# Patient Record
Sex: FEMALE | Race: WHITE | NOT HISPANIC OR LATINO | Employment: FULL TIME | URBAN - METROPOLITAN AREA
[De-identification: names, ages, dates, MRNs, and addresses within clinical notes are randomized per-mention and may not be internally consistent; named-entity substitution may affect disease eponyms.]

---

## 2022-05-15 PROCEDURE — U0004 COV-19 TEST NON-CDC HGH THRU: HCPCS | Performed by: FAMILY MEDICINE

## 2023-01-01 ENCOUNTER — TELEMEDICINE (OUTPATIENT)
Dept: FAMILY MEDICINE CLINIC | Facility: TELEHEALTH | Age: 43
End: 2023-01-01
Payer: MEDICAID

## 2023-01-01 DIAGNOSIS — J20.9 ACUTE BRONCHITIS, UNSPECIFIED ORGANISM: Primary | ICD-10-CM

## 2023-01-01 PROBLEM — G47.00 INSOMNIA: Status: ACTIVE | Noted: 2020-02-13

## 2023-01-01 PROBLEM — R74.8 ELEVATED LIVER ENZYMES: Status: ACTIVE | Noted: 2020-05-08

## 2023-01-01 PROBLEM — B18.2 CHRONIC HEPATITIS C: Status: ACTIVE | Noted: 2019-08-12

## 2023-01-01 PROBLEM — F11.21 OPIOID DEPENDENCE IN REMISSION: Status: ACTIVE | Noted: 2019-08-12

## 2023-01-01 PROBLEM — R13.19 INTERMITTENT DYSPHAGIA: Status: ACTIVE | Noted: 2017-06-23

## 2023-01-01 PROBLEM — I10 HYPERTENSIVE DISORDER: Status: ACTIVE | Noted: 2017-08-17

## 2023-01-01 PROBLEM — F32.A DEPRESSIVE DISORDER: Status: ACTIVE | Noted: 2017-08-17

## 2023-01-01 PROBLEM — N94.3 PREMENSTRUAL TENSION SYNDROME: Status: ACTIVE | Noted: 2017-08-17

## 2023-01-01 PROBLEM — Z00.00 WELL ADULT HEALTH CHECK: Status: ACTIVE | Noted: 2020-08-18

## 2023-01-01 PROBLEM — F17.200 NICOTINE DEPENDENCE: Status: ACTIVE | Noted: 2020-02-13

## 2023-01-01 PROCEDURE — 99213 OFFICE O/P EST LOW 20 MIN: CPT | Performed by: NURSE PRACTITIONER

## 2023-01-01 RX ORDER — DEXTROAMPHETAMINE SACCHARATE, AMPHETAMINE ASPARTATE, DEXTROAMPHETAMINE SULFATE AND AMPHETAMINE SULFATE 5; 5; 5; 5 MG/1; MG/1; MG/1; MG/1
1 TABLET ORAL DAILY
COMMUNITY
Start: 2022-12-06

## 2023-01-01 RX ORDER — ZOLPIDEM TARTRATE 12.5 MG/1
12.5 TABLET, FILM COATED, EXTENDED RELEASE ORAL
COMMUNITY
Start: 2022-12-06

## 2023-01-01 RX ORDER — LINACLOTIDE 145 UG/1
CAPSULE, GELATIN COATED ORAL
COMMUNITY
Start: 2022-10-06 | End: 2023-01-01 | Stop reason: DRUGHIGH

## 2023-01-01 RX ORDER — BUPRENORPHINE AND NALOXONE 12; 3 MG/1; MG/1
FILM, SOLUBLE BUCCAL; SUBLINGUAL
COMMUNITY
Start: 2022-12-21

## 2023-01-01 RX ORDER — DEXTROMETHORPHAN HYDROBROMIDE AND PROMETHAZINE HYDROCHLORIDE 15; 6.25 MG/5ML; MG/5ML
5 SYRUP ORAL 4 TIMES DAILY PRN
Qty: 120 ML | Refills: 0 | Status: SHIPPED | OUTPATIENT
Start: 2023-01-01 | End: 2023-03-19

## 2023-01-01 RX ORDER — DEXTROAMPHETAMINE SULFATE, DEXTROAMPHETAMINE SACCHARATE, AMPHETAMINE SULFATE AND AMPHETAMINE ASPARTATE 7.5; 7.5; 7.5; 7.5 MG/1; MG/1; MG/1; MG/1
30 CAPSULE, EXTENDED RELEASE ORAL EVERY MORNING
COMMUNITY
Start: 2022-12-06

## 2023-01-01 RX ORDER — METHYLPREDNISOLONE 4 MG/1
TABLET ORAL
Qty: 21 TABLET | Refills: 0 | Status: SHIPPED | OUTPATIENT
Start: 2023-01-01 | End: 2023-02-09

## 2023-01-01 RX ORDER — COVID-19 MOLECULAR TEST ASSAY
1 KIT MISCELLANEOUS ONCE
Qty: 1 KIT | Refills: 0 | Status: SHIPPED | OUTPATIENT
Start: 2023-01-01 | End: 2023-01-01

## 2023-01-01 RX ORDER — BUPRENORPHINE AND NALOXONE 8; 2 MG/1; MG/1
FILM, SOLUBLE BUCCAL; SUBLINGUAL
COMMUNITY
Start: 2022-12-13

## 2023-01-01 NOTE — PATIENT INSTRUCTIONS
Drink plenty of water  Over the counter pain relievers okay   If symptoms do not improve in 3-5 days follow up with your primary care provider or urgent care  \  Acute Bronchitis, Adult  Acute bronchitis is when air tubes in the lungs (bronchi) suddenly get swollen. The condition can make it hard for you to breathe. In adults, acute bronchitis usually goes away within 2 weeks. A cough caused by bronchitis may last up to 3 weeks. Smoking, allergies, and asthma can make the condition worse.  What are the causes?  Germs that cause cold and flu (viruses). The most common cause of this condition is the virus that causes the common cold.  Bacteria.  Substances that bother (irritate) the lungs, including:  Smoke from cigarettes and other types of tobacco.  Dust and pollen.  Fumes from chemicals, gases, or burned fuel.  Indoor or outdoor air pollution.  What increases the risk?  A weak body's defense system. This is also called the immune system.  Any condition that affects your lungs and breathing, such as asthma.  What are the signs or symptoms?  A cough.  Coughing up clear, yellow, or green mucus.  Making high-pitched whistling sounds when you breathe, most often when you breathe out (wheezing).  Runny or stuffy nose.  Having too much mucus in your lungs (chest congestion).  Shortness of breath.  Body aches.  A sore throat.  How is this treated?  Acute bronchitis may go away over time without treatment. Your doctor may tell you to:  Drink more fluids. This will help thin your mucus so it is easier to cough up.  Use a device that gets medicine into your lungs (inhaler).  Use a vaporizer or a humidifier. These are machines that add water to the air. This helps with coughing and poor breathing.  Take a medicine that thins mucus and helps clear it from your lungs.  Take a medicine that prevents or stops coughing.  It is not common to take an antibiotic medicine for this condition.  Follow these instructions at home:    Take  over-the-counter and prescription medicines only as told by your doctor.  Use an inhaler, vaporizer, or humidifier as told by your doctor.  Take two teaspoons (10 mL) of honey at bedtime. This helps lessen your coughing at night.  Drink enough fluid to keep your pee (urine) pale yellow.  Do not smoke or use any products that contain nicotine or tobacco. If you need help quitting, ask your doctor.  Get a lot of rest.  Return to your normal activities when your doctor says that it is safe.  Keep all follow-up visits.  How is this prevented?    Wash your hands often with soap and water for at least 20 seconds. If you cannot use soap and water, use hand .  Avoid contact with people who have cold symptoms.  Try not to touch your mouth, nose, or eyes with your hands.  Avoid breathing in smoke or chemical fumes.  Make sure to get the flu shot every year.  Contact a doctor if:  Your symptoms do not get better in 2 weeks.  You have trouble coughing up the mucus.  Your cough keeps you awake at night.  You have a fever.  Get help right away if:  You cough up blood.  You have chest pain.  You have very bad shortness of breath.  You faint or keep feeling like you are going to faint.  You have a very bad headache.  Your fever or chills get worse.  These symptoms may be an emergency. Get help right away. Call your local emergency services (911 in the [place]  Do not wait to see if the symptoms will go away.  Do not drive yourself to the hospital.  Summary  Acute bronchitis is when air tubes in the lungs (bronchi) suddenly get swollen. In adults, acute bronchitis usually goes away within 2 weeks.  Drink more fluids. This will help thin your mucus so it is easier to cough up.  Take over-the-counter and prescription medicines only as told by your doctor.  Contact a doctor if your symptoms do not improve after 2 weeks of treatment.  This information is not intended to replace advice given to you by your health care provider.  Make sure you discuss any questions you have with your health care provider.  Document Revised: 04/20/2022 Document Reviewed: 04/20/2022  Elsevier Patient Education © 2022 Elsevier Inc.

## 2023-01-01 NOTE — PROGRESS NOTES
CHIEF COMPLAINT  Chief Complaint   Patient presents with   • Cough         HPI  Andra Crandall is a 42 y.o. female  presents with complaint of cough and chest congestion.     Review of Systems   Constitutional: Positive for fatigue. Negative for chills, diaphoresis and fever.   HENT: Positive for congestion (mild ), ear pain and postnasal drip (small amount ). Negative for rhinorrhea, sinus pressure, sinus pain, sneezing and sore throat.    Respiratory: Positive for cough. Negative for chest tightness, shortness of breath and wheezing.    Gastrointestinal: Negative for diarrhea, nausea and vomiting.   Neurological: Positive for headaches.       Past Medical History:   Diagnosis Date   • Depression    • Hypertension    • Substance abuse (HCC)     7 yrs ago       No family history on file.    Social History     Socioeconomic History   • Marital status: Unknown   Tobacco Use   • Smoking status: Some Days     Types: Cigarettes   • Smokeless tobacco: Never   Vaping Use   • Vaping Use: Never used   Substance and Sexual Activity   • Alcohol use: No   • Drug use: No   • Sexual activity: Defer       Andra Crandall  reports that she has been smoking cigarettes. She has never used smokeless tobacco.. I have educated her on the risk of diseases from using tobacco products such as cancer, COPD and heart disease.     I advised her to quit and she is not willing to quit.    I spent 1 minutes counseling the patient.       There were no vitals taken for this visit.    PHYSICAL EXAM  Physical Exam   Constitutional: She is oriented to person, place, and time. She appears well-developed and well-nourished. She does not have a sickly appearance. She does not appear ill. No distress.   HENT:   Head: Normocephalic and atraumatic.   Eyes: EOM are normal.   Neck: Neck normal appearance.  Pulmonary/Chest: Effort normal.  No respiratory distress.  Neurological: She is alert and oriented to person, place, and time.   Skin: Skin is dry.    Psychiatric: She has a normal mood and affect.         Diagnoses and all orders for this visit:    1. Acute bronchitis, unspecified organism (Primary)    Other orders  -     methylPREDNISolone (MEDROL) 4 MG dose pack; Take early in the day and take with food. Take as directed on package instructions.  Dispense: 21 tablet; Refill: 0  -     promethazine-dextromethorphan (PROMETHAZINE-DM) 6.25-15 MG/5ML syrup; Take 5 mL by mouth 4 (Four) Times a Day As Needed for Cough.  Dispense: 120 mL; Refill: 0  -     COVID-19 At Home Antigen Test (BinaxNOW COVID-19 Ag Home Test) kit; 1 application by In Vitro route 1 (One) Time for 1 dose.  Dispense: 1 kit; Refill: 0    Recommend COVID -19 testing     The use of a video visit has been reviewed with the patient and verbal informed consent has been obtained. Myself and Andra Crandall participated in this visit. The patient is located in [address] [place] [address]. I am located in [place], [place]. [employer] and [employer] were utilized.       Note Disclaimer: At [facility], we believe that sharing information builds trust and better   relationships. You are receiving this note because you recently visited [facility]. It is possible you   will see health information before a provider has talked with you about it. This kind of information can   be easy to misunderstand. To help you fully understand what it means for your health, we urge you to   discuss this note with your provider.    KERRY Verma  01/01/2023  10:58 EST

## 2023-02-09 ENCOUNTER — TELEMEDICINE (OUTPATIENT)
Dept: FAMILY MEDICINE CLINIC | Facility: TELEHEALTH | Age: 43
End: 2023-02-09
Payer: MEDICAID

## 2023-02-09 DIAGNOSIS — J06.9 UPPER RESPIRATORY TRACT INFECTION, UNSPECIFIED TYPE: Primary | ICD-10-CM

## 2023-02-09 PROCEDURE — 99213 OFFICE O/P EST LOW 20 MIN: CPT | Performed by: NURSE PRACTITIONER

## 2023-02-09 RX ORDER — COVID-19 MOLECULAR TEST ASSAY
KIT MISCELLANEOUS SEE ADMIN INSTRUCTIONS
COMMUNITY
Start: 2023-01-02 | End: 2023-03-19

## 2023-02-09 RX ORDER — METHYLPREDNISOLONE 4 MG/1
TABLET ORAL
Qty: 21 TABLET | Refills: 0 | Status: SHIPPED | OUTPATIENT
Start: 2023-02-09 | End: 2023-03-19

## 2023-02-09 RX ORDER — BROMPHENIRAMINE MALEATE, PSEUDOEPHEDRINE HYDROCHLORIDE, AND DEXTROMETHORPHAN HYDROBROMIDE 2; 30; 10 MG/5ML; MG/5ML; MG/5ML
10 SYRUP ORAL 4 TIMES DAILY PRN
Qty: 200 ML | Refills: 0 | Status: SHIPPED | OUTPATIENT
Start: 2023-02-09 | End: 2023-03-19

## 2023-02-09 RX ORDER — MIRTAZAPINE 15 MG/1
TABLET, FILM COATED ORAL
COMMUNITY
Start: 2023-01-10

## 2023-02-09 NOTE — PROGRESS NOTES
You have chosen to receive care through a telehealth visit.  Do you consent to use a video/audio connection for your medical care today? Yes     CHIEF COMPLAINT  No chief complaint on file.        HPI  Andra Crandall is a 43 y.o. female  presents with complaint of 2 day history of nasal congestion, PND, mild chest congestion, dry cough, swollen nodes under chin and in front of neck. Denies fever. COVID test was negative.     Review of Systems   See HPI    Past Medical History:   Diagnosis Date   • Depression    • Hypertension    • Substance abuse (HCC)     7 yrs ago       No family history on file.    Social History     Socioeconomic History   • Marital status:    Tobacco Use   • Smoking status: Some Days     Types: Cigarettes   • Smokeless tobacco: Never   Vaping Use   • Vaping Use: Never used   Substance and Sexual Activity   • Alcohol use: No   • Drug use: No   • Sexual activity: Defer       Andra Crandall  reports that she has been smoking cigarettes. She has never used smokeless tobacco..            There were no vitals taken for this visit.    PHYSICAL EXAM  Physical Exam   Constitutional: She is oriented to person, place, and time. She appears well-developed and well-nourished. She does not have a sickly appearance. She does not appear ill.   HENT:   Head: Normocephalic and atraumatic.   Pulmonary/Chest: Effort normal.  No respiratory distress (persistent cough during visit).  Neurological: She is alert and oriented to person, place, and time.         Diagnoses and all orders for this visit:    1. Upper respiratory tract infection, unspecified type (Primary)  -     methylPREDNISolone (MEDROL) 4 MG dose pack; Take as directed on package instructions.  Dispense: 21 tablet; Refill: 0  -     brompheniramine-pseudoephedrine-DM 30-2-10 MG/5ML syrup; Take 10 mL by mouth 4 (Four) Times a Day As Needed for Congestion, Cough or Allergies.  Dispense: 200 mL; Refill: 0    --take medications as  prescribed  --increase fluids, rest as needed, tylenol or ibuprofen for pain  --f/u in 5-7 days if no improvement        FOLLOW-UP  As discussed during visit with PCP/[facility] if no improvement or Urgent Care/Emergency Department if worsening of symptoms    Patient verbalizes understanding of medication dosage, comfort measures, instructions for treatment and follow-up.    Lyssa Bishop, APRN  02/09/2023  16:10 EST    The use of a video visit has been reviewed with the patient and verbal informed consent has been obtained. Myself and Andra Crandall participated in this visit. The patient is located in [address] [place] [address].    I am located in [place], [place]. [employer] and [employer] were utilized. I spent 8 minutes in the patient's chart for this visit.

## 2023-02-09 NOTE — PATIENT INSTRUCTIONS
Upper Respiratory Infection, Adult  An upper respiratory infection (URI) is a common viral infection of the nose, throat, and upper air passages that lead to the lungs. The most common type of URI is the common cold. URIs usually get better on their own, without medical treatment.  What are the causes?  A URI is caused by a virus. You may catch a virus by:  Breathing in droplets from an infected person's cough or sneeze.  Touching something that has been exposed to the virus (is contaminated) and then touching your mouth, nose, or eyes.  What increases the risk?  You are more likely to get a URI if:  You are very young or very old.  You have close contact with others, such as at work, school, or a health care facility.  You smoke.  You have long-term (chronic) heart or lung disease.  You have a weakened disease-fighting system (immune system).  You have nasal allergies or asthma.  You are experiencing a lot of stress.  You have poor nutrition.  What are the signs or symptoms?  A URI usually involves some of the following symptoms:  Runny or stuffy (congested) nose.  Cough.  Sneezing.  Sore throat.  Headache.  Fatigue.  Fever.  Loss of appetite.  Pain in your forehead, behind your eyes, and over your cheekbones (sinus pain).  Muscle aches.  Redness or irritation of the eyes.  Pressure in the ears or face.  How is this diagnosed?  This condition may be diagnosed based on your medical history and symptoms, and a physical exam. Your health care provider may use a swab to take a mucus sample from your nose (nasal swab). This sample can be tested to determine what virus is causing the illness.  How is this treated?  URIs usually get better on their own within 7-10 days. Medicines cannot cure URIs, but your health care provider may recommend certain medicines to help relieve symptoms, such as:  Over-the-counter cold medicines.  Cough suppressants. Coughing is a type of defense against infection that helps to clear the  respiratory system, so take these medicines only as recommended by your health care provider.  Fever-reducing medicines.  Follow these instructions at home:  Activity  Rest as needed.  If you have a fever, stay home from work or school until your fever is gone or until your health care provider says your URI cannot spread to other people (is no longer contagious). Your health care provider may have you wear a face mask to prevent your infection from spreading.  Relieving symptoms  Gargle with a mixture of salt and water 3-4 times a day or as needed. To make salt water, completely dissolve ½-1 tsp (3-6 g) of salt in 1 cup (237 mL) of warm water.  Use a cool-mist humidifier to add moisture to the air. This can help you breathe more easily.  Eating and drinking    Drink enough fluid to keep your urine pale yellow.  Eat soups and other clear broths.  General instructions    Take over-the-counter and prescription medicines only as told by your health care provider. These include cold medicines, fever reducers, and cough suppressants.  Do not use any products that contain nicotine or tobacco. These products include cigarettes, chewing tobacco, and vaping devices, such as e-cigarettes. If you need help quitting, ask your health care provider.  Stay away from secondhand smoke.  Stay up to date on all immunizations, including the yearly (annual) flu vaccine.  Keep all follow-up visits. This is important.  How to prevent the spread of infection to others  URIs can be contagious. To prevent the infection from spreading:  Wash your hands with soap and water for at least 20 seconds. If soap and water are not available, use hand .  Avoid touching your mouth, face, eyes, or nose.  Cough or sneeze into a tissue or your sleeve or elbow instead of into your hand or into the air.    Contact a health care provider if:  You are getting worse instead of better.  You have a fever or chills.  Your mucus is brown or red.  You have  yellow or brown discharge coming from your nose.  You have pain in your face, especially when you bend forward.  You have swollen neck glands.  You have pain while swallowing.  You have white areas in the back of your throat.  Get help right away if:  You have shortness of breath that gets worse.  You have severe or persistent:  Headache.  Ear pain.  Sinus pain.  Chest pain.  You have chronic lung disease along with any of the following:  Making high-pitched whistling sounds when you breathe, most often when you breathe out (wheezing).  Prolonged cough (more than 14 days).  Coughing up blood.  A change in your usual mucus.  You have a stiff neck.  You have changes in your:  Vision.  Hearing.  Thinking.  Mood.  These symptoms may be an emergency. Get help right away. Call 911.  Do not wait to see if the symptoms will go away.  Do not drive yourself to the hospital.  Summary  An upper respiratory infection (URI) is a common infection of the nose, throat, and upper air passages that lead to the lungs.  A URI is caused by a virus.  URIs usually get better on their own within 7-10 days.  Medicines cannot cure URIs, but your health care provider may recommend certain medicines to help relieve symptoms.  This information is not intended to replace advice given to you by your health care provider. Make sure you discuss any questions you have with your health care provider.  Document Revised: 07/20/2022 Document Reviewed: 07/20/2022  [employer] Patient Education © 2022 Elsevier Inc.

## 2023-03-19 ENCOUNTER — TELEMEDICINE (OUTPATIENT)
Dept: FAMILY MEDICINE CLINIC | Facility: TELEHEALTH | Age: 43
End: 2023-03-19
Payer: MEDICAID

## 2023-03-19 DIAGNOSIS — J01.00 ACUTE NON-RECURRENT MAXILLARY SINUSITIS: Primary | ICD-10-CM

## 2023-03-19 PROCEDURE — 99213 OFFICE O/P EST LOW 20 MIN: CPT | Performed by: NURSE PRACTITIONER

## 2023-03-19 RX ORDER — PREDNISONE 20 MG/1
20 TABLET ORAL 2 TIMES DAILY
Qty: 10 TABLET | Refills: 0 | Status: SHIPPED | OUTPATIENT
Start: 2023-03-19 | End: 2023-03-24

## 2023-03-19 RX ORDER — AMOXICILLIN AND CLAVULANATE POTASSIUM 875; 125 MG/1; MG/1
1 TABLET, FILM COATED ORAL 2 TIMES DAILY
Qty: 20 TABLET | Refills: 0 | Status: SHIPPED | OUTPATIENT
Start: 2023-03-19 | End: 2023-03-29

## 2023-03-19 RX ORDER — FLUTICASONE PROPIONATE 50 MCG
2 SPRAY, SUSPENSION (ML) NASAL DAILY
Qty: 18.2 ML | Refills: 0 | Status: SHIPPED | OUTPATIENT
Start: 2023-03-19

## 2023-03-19 NOTE — PROGRESS NOTES
You have chosen to receive care through a telehealth visit.  Do you consent to use a video/audio connection for your medical care today? Yes     CHIEF COMPLAINT  Chief Complaint   Patient presents with   • Cough         HPI  Andra Crandall is a 43 y.o. female  presents with complaint of    Review of Systems   HENT: Positive for postnasal drip, sinus pressure and sinus pain.    Respiratory: Positive for cough.    All other systems reviewed and are negative.      Past Medical History:   Diagnosis Date   • Depression    • Hypertension    • Substance abuse (HCC)     7 yrs ago       History reviewed. No pertinent family history.    Social History     Socioeconomic History   • Marital status:    Tobacco Use   • Smoking status: Some Days     Types: Cigarettes   • Smokeless tobacco: Never   Vaping Use   • Vaping Use: Never used   Substance and Sexual Activity   • Alcohol use: No   • Drug use: No   • Sexual activity: Defer       Andra Crandall  reports that she has been smoking cigarettes. She has never used smokeless tobacco.. I have educated her on the risk of diseases from using tobacco products such as cancer, COPD and heart disease.     I advised her to quit and she is not willing to quit.    I spent 3  minutes counseling the patient.              There were no vitals taken for this visit.    PHYSICAL EXAM  Physical Exam   Constitutional: She appears well-developed and well-nourished.   HENT:   Head: Normocephalic.   Eyes: Pupils are equal, round, and reactive to light.   Pulmonary/Chest: Effort normal.   Musculoskeletal: Normal range of motion.   Neurological: She is alert.   Psychiatric: She has a normal mood and affect.       Results for orders placed or performed during the hospital encounter of 05/15/22   COVID-19 PCR, [email or web address], NP SWAB IN LEXAR VIRAL TRANSPORT MEDIA/ORAL SWISH 24-30 HR TAT - Saliva, Oral Cavity    Specimen: Oral Cavity; Saliva   Result Value Ref Range    SARS-CoV-2 RAFA Not  Detected Not Detected       Diagnoses and all orders for this visit:    1. Acute non-recurrent maxillary sinusitis (Primary)  -     amoxicillin-clavulanate (Augmentin) 875-125 MG per tablet; Take 1 tablet by mouth 2 (Two) Times a Day for 10 days.  Dispense: 20 tablet; Refill: 0  -     fluticasone (FLONASE) 50 MCG/ACT nasal spray; 2 sprays into the nostril(s) as directed by provider Daily.  Dispense: 18.2 mL; Refill: 0  -     predniSONE (DELTASONE) 20 MG tablet; Take 1 tablet by mouth 2 (Two) Times a Day for 5 days.  Dispense: 10 tablet; Refill: 0          FOLLOW-UP  As discussed during visit with PCP/[facility] if no improvement or Urgent Care/Emergency Department if worsening of symptoms    Patient verbalizes understanding of medication dosage, comfort measures, instructions for treatment and follow-up.    Nancie Cloud, KERRY  03/19/2023  19:09 EDT    The use of a video visit has been reviewed with the patient and verbal informed consent has been obtained. Myself and Andra Crandall participated in this visit. The patient is located in [address] [place] [address]  [place] [id number].    I am located in [place], [place]. [employer] and [employer] were utilized. I spent 20 minutes in the patient's chart for this visit.

## 2023-08-04 ENCOUNTER — TELEMEDICINE (OUTPATIENT)
Dept: FAMILY MEDICINE CLINIC | Facility: TELEHEALTH | Age: 43
End: 2023-08-04
Payer: MEDICAID

## 2023-08-04 DIAGNOSIS — R39.89 SUSPECTED UTI: Primary | ICD-10-CM

## 2023-08-04 RX ORDER — NITROFURANTOIN 25; 75 MG/1; MG/1
100 CAPSULE ORAL 2 TIMES DAILY
Qty: 10 CAPSULE | Refills: 0 | Status: SHIPPED | OUTPATIENT
Start: 2023-08-04 | End: 2023-08-09

## 2023-08-04 RX ORDER — PHENAZOPYRIDINE HYDROCHLORIDE 200 MG/1
200 TABLET, FILM COATED ORAL 3 TIMES DAILY PRN
Qty: 6 TABLET | Refills: 0 | Status: SHIPPED | OUTPATIENT
Start: 2023-08-04 | End: 2023-08-06

## 2023-08-04 RX ORDER — FLUCONAZOLE 150 MG/1
TABLET ORAL
Qty: 2 TABLET | Refills: 0 | Status: SHIPPED | OUTPATIENT
Start: 2023-08-04

## 2023-10-26 ENCOUNTER — TELEMEDICINE (OUTPATIENT)
Dept: FAMILY MEDICINE CLINIC | Facility: TELEHEALTH | Age: 43
End: 2023-10-26
Payer: MEDICAID

## 2023-10-26 DIAGNOSIS — R39.89 SUSPECTED UTI: Primary | ICD-10-CM

## 2023-10-26 RX ORDER — PHENAZOPYRIDINE HYDROCHLORIDE 200 MG/1
200 TABLET, FILM COATED ORAL 3 TIMES DAILY PRN
Qty: 6 TABLET | Refills: 0 | Status: SHIPPED | OUTPATIENT
Start: 2023-10-26 | End: 2023-10-28

## 2023-10-26 RX ORDER — NITROFURANTOIN 25; 75 MG/1; MG/1
100 CAPSULE ORAL 2 TIMES DAILY
Qty: 10 CAPSULE | Refills: 0 | Status: SHIPPED | OUTPATIENT
Start: 2023-10-26 | End: 2023-10-31

## 2023-10-26 RX ORDER — MICONAZOLE NITRATE 40 MG/G
1 CREAM VAGINAL
Qty: 3 G | Refills: 0 | Status: SHIPPED | OUTPATIENT
Start: 2023-10-26 | End: 2023-10-29

## 2023-10-26 NOTE — PROGRESS NOTES
GHAZALA  Andra Crandall is a 43 y.o. female  presents with complaint of two day history of malodorous urine, urgency, dysuria. Has history of UTI's and this feels the same. Denies fever, chills, sweats, flank pain.     Review of Systems    Past Medical History:   Diagnosis Date    Depression     Hypertension     Substance abuse     7 yrs ago       No family history on file.    Social History     Socioeconomic History    Marital status:    Tobacco Use    Smoking status: Some Days     Types: Cigarettes    Smokeless tobacco: Never   Vaping Use    Vaping Use: Never used   Substance and Sexual Activity    Alcohol use: No    Drug use: No    Sexual activity: Defer         There were no vitals taken for this visit.    PHYSICAL EXAM  Physical Exam   Constitutional: She appears well-developed and well-nourished.   HENT:   Head: Normocephalic.   Nose: Nose normal.   Neck: Neck normal appearance.  Pulmonary/Chest: Effort normal.   Neurological: She is alert.   Psychiatric: She has a normal mood and affect. Her speech is normal.       Diagnoses and all orders for this visit:    1. Suspected UTI (Primary)  -     nitrofurantoin, macrocrystal-monohydrate, (Macrobid) 100 MG capsule; Take 1 capsule by mouth 2 (Two) Times a Day for 5 days.  Dispense: 10 capsule; Refill: 0  -     phenazopyridine (Pyridium) 200 MG tablet; Take 1 tablet by mouth 3 (Three) Times a Day As Needed for Bladder Spasms for up to 2 days.  Dispense: 6 tablet; Refill: 0  -     Miconazole Nitrate (Miconazole 3) 4 % cream; Insert 1 application  into the vagina every night at bedtime for 3 days.  Dispense: 3 g; Refill: 0          FOLLOW-UP  As discussed during visit with [facility], if symptoms worsen or fail to improve, follow-up with PCP/Urgent Care/Emergency Department.    Patient verbalizes understanding of medications, instructions for treatment and follow-up.    Sosa Oshea, APRN  10/26/2023  11:13 EDT    The use of a video visit has  been reviewed with the patient and verbal informed consent has been obtained. Myself and Andra Crandall participated in this visit. The patient is located in [place], [place], and I am located in [place], [place]. [employer] and [employer] were utilized.

## 2023-10-30 RX ORDER — SULFAMETHOXAZOLE AND TRIMETHOPRIM 800; 160 MG/1; MG/1
1 TABLET ORAL 2 TIMES DAILY
Qty: 20 TABLET | Refills: 0 | Status: SHIPPED | OUTPATIENT
Start: 2023-10-30 | End: 2023-11-09

## 2024-01-21 ENCOUNTER — PATIENT MESSAGE (OUTPATIENT)
Dept: FAMILY MEDICINE CLINIC | Facility: TELEHEALTH | Age: 44
End: 2024-01-21

## 2024-01-21 ENCOUNTER — TELEMEDICINE (OUTPATIENT)
Dept: FAMILY MEDICINE CLINIC | Facility: TELEHEALTH | Age: 44
End: 2024-01-21
Payer: MEDICAID

## 2024-01-21 DIAGNOSIS — J01.00 ACUTE NON-RECURRENT MAXILLARY SINUSITIS: Primary | ICD-10-CM

## 2024-01-21 RX ORDER — PREDNISONE 20 MG/1
20 TABLET ORAL 2 TIMES DAILY
Qty: 10 TABLET | Refills: 0 | Status: SHIPPED | OUTPATIENT
Start: 2024-01-21 | End: 2024-01-26

## 2024-01-21 RX ORDER — AMOXICILLIN AND CLAVULANATE POTASSIUM 875; 125 MG/1; MG/1
1 TABLET, FILM COATED ORAL 2 TIMES DAILY
Qty: 20 TABLET | Refills: 0 | Status: SHIPPED | OUTPATIENT
Start: 2024-01-21 | End: 2024-01-31

## 2024-01-22 NOTE — PROGRESS NOTES
You have chosen to receive care through a telehealth visit.  Do you consent to use a video/audio connection for your medical care today? Yes     CHIEF COMPLAINT  Chief Complaint   Patient presents with    Sinusitis         HPI  Andra Crandall is a 44 y.o. female  presents with complaint of sinus pain and pressure with headache and cough x 5 days.    Review of Systems   HENT:  Positive for postnasal drip, sinus pressure, sinus pain and voice change.    Neurological:  Positive for headaches.   All other systems reviewed and are negative.      Past Medical History:   Diagnosis Date    Depression     Hypertension     Substance abuse     7 yrs ago       History reviewed. No pertinent family history.    Social History     Socioeconomic History    Marital status:    Tobacco Use    Smoking status: Some Days     Types: Cigarettes    Smokeless tobacco: Never   Vaping Use    Vaping Use: Never used   Substance and Sexual Activity    Alcohol use: No    Drug use: No    Sexual activity: Defer       Andra Crandall  reports that she has been smoking cigarettes. She has never used smokeless tobacco.. I have educated her on the risk of diseases from using tobacco products such as cancer, COPD, and heart disease.     I advised her to quit and she is not willing to quit.    I spent  1  minutes counseling the patient.              There were no vitals taken for this visit.    PHYSICAL EXAM  Physical Exam   Constitutional: She appears well-developed and well-nourished.   HENT:   Head: Normocephalic.   Eyes: Pupils are equal, round, and reactive to light.   Pulmonary/Chest: Effort normal.   Musculoskeletal: Normal range of motion.   Neurological: She is alert.   Psychiatric: She has a normal mood and affect.       Results for orders placed or performed during the hospital encounter of 05/15/22   COVID-19 PCR, [facility], NP SWAB IN [facility] TRANSPORT MEDIA/ORAL SWISH 24-30 HR TAT - Saliva, Oral Cavity    Specimen: Oral  Cavity; Saliva   Result Value Ref Range    SARS-CoV-2 RAFA Not Detected Not Detected       Diagnoses and all orders for this visit:    1. Acute non-recurrent maxillary sinusitis (Primary)  -     amoxicillin-clavulanate (AUGMENTIN) 875-125 MG per tablet; Take 1 tablet by mouth 2 (Two) Times a Day for 10 days.  Dispense: 20 tablet; Refill: 0  -     predniSONE (DELTASONE) 20 MG tablet; Take 1 tablet by mouth 2 (Two) Times a Day for 5 days.  Dispense: 10 tablet; Refill: 0  -     budesonide (PULMICORT) 180 MCG/ACT inhaler; Inhale 1 puff 2 (Two) Times a Day for 7 days.  Dispense: 1 each; Refill: 0          FOLLOW-UP  As discussed during visit with PCP/[facility] if no improvement or Urgent Care/Emergency Department if worsening of symptoms    Patient verbalizes understanding of medication dosage, comfort measures, instructions for treatment and follow-up.    Nancie Cloud, KERRY  01/21/2024  21:31 EST    The use of a video visit has been reviewed with the patient and verbal informed consent has been obtained. Myself and Andra Crandall participated in this visit. The patient is located in [address] [place] [address].    I am located in [place], [place]. [employer] and [employer] were utilized. I spent 10 minutes in the patient's chart for this visit.

## 2024-01-22 NOTE — TELEPHONE ENCOUNTER
From: Andra Crandall  To: Nancie Cloud  Sent: 1/21/2024 9:55 PM EST  Subject: Pulmicort     Could you please write the pulmicort inhaler for generic otherwise I'll have to get a prior authorization

## 2024-01-23 RX ORDER — ALBUTEROL SULFATE 90 UG/1
2 AEROSOL, METERED RESPIRATORY (INHALATION) EVERY 4 HOURS PRN
Qty: 18 G | Refills: 0 | Status: SHIPPED | OUTPATIENT
Start: 2024-01-23

## 2024-01-23 RX ORDER — BUDESONIDE 0.25 MG/2ML
0.25 INHALANT ORAL
Qty: 14 ML | Refills: 0 | Status: SHIPPED | OUTPATIENT
Start: 2024-01-23 | End: 2024-01-30

## 2024-03-23 ENCOUNTER — TELEMEDICINE (OUTPATIENT)
Dept: FAMILY MEDICINE CLINIC | Facility: TELEHEALTH | Age: 44
End: 2024-03-23
Payer: MEDICAID

## 2024-03-23 DIAGNOSIS — J22 LOWER RESPIRATORY INFECTION (E.G., BRONCHITIS, PNEUMONIA, PNEUMONITIS, PULMONITIS): Primary | ICD-10-CM

## 2024-03-23 RX ORDER — DEXTROMETHORPHAN HYDROBROMIDE AND PROMETHAZINE HYDROCHLORIDE 15; 6.25 MG/5ML; MG/5ML
5 SYRUP ORAL 4 TIMES DAILY PRN
Qty: 118 ML | Refills: 0 | Status: SHIPPED | OUTPATIENT
Start: 2024-03-23

## 2024-03-23 RX ORDER — AZITHROMYCIN 250 MG/1
TABLET, FILM COATED ORAL
Qty: 6 TABLET | Refills: 0 | Status: SHIPPED | OUTPATIENT
Start: 2024-03-23

## 2024-03-23 RX ORDER — PREDNISONE 10 MG/1
TABLET ORAL DAILY
Qty: 21 EACH | Refills: 0 | Status: SHIPPED | OUTPATIENT
Start: 2024-03-23 | End: 2024-03-29

## 2024-03-23 NOTE — PROGRESS NOTES
Subjective   Chief Complaint   Patient presents with    URI       Andra Crandall is a 44 y.o. female.     History of Present Illness  Patient reports cough, chest congestion, nasal congestion, headache, runny nose, drainage, wheezing and difficulty taking a deep breath for about 3 days.  Mucus is green and thick.  She feels like she has a respiratory infection.  URI   This is a new problem. Episode onset: 3 days. The problem has been gradually worsening. There has been no fever. Associated symptoms include congestion, coughing, headaches, rhinorrhea, sinus pain, a sore throat and wheezing. Pertinent negatives include no abdominal pain, chest pain, diarrhea, dysuria, ear pain, nausea or vomiting. Treatments tried: mucinex. The treatment provided no relief.        No Known Allergies    Past Medical History:   Diagnosis Date    Depression     Hypertension     Substance abuse     7 yrs ago       History reviewed. No pertinent surgical history.    Social History     Socioeconomic History    Marital status:    Tobacco Use    Smoking status: Some Days     Types: Cigarettes    Smokeless tobacco: Never   Vaping Use    Vaping status: Never Used   Substance and Sexual Activity    Alcohol use: No    Drug use: No    Sexual activity: Defer       History reviewed. No pertinent family history.      Current Outpatient Medications:     sertraline (ZOLOFT) 50 MG tablet, , Disp: , Rfl:     Adderall XR 30 MG 24 hr capsule, Take 1 capsule by mouth Every Morning, Disp: , Rfl:     albuterol sulfate  (90 Base) MCG/ACT inhaler, Inhale 2 puffs Every 4 (Four) Hours As Needed for Wheezing., Disp: 18 g, Rfl: 0    amphetamine-dextroamphetamine (ADDERALL) 20 MG tablet, Take 1 tablet by mouth Daily., Disp: , Rfl:     azithromycin (Zithromax Z-Cem) 250 MG tablet, Take 2 tablets by mouth on day 1, then 1 tablet daily on days 2-5, Disp: 6 tablet, Rfl: 0    budesonide (Pulmicort) 0.25 MG/2ML nebulizer solution, Take 2 mL by  nebulization Daily for 7 days., Disp: 14 mL, Rfl: 0    buprenorphine-naloxone (SUBOXONE) 8-2 MG film film, , Disp: , Rfl:     buPROPion XL (WELLBUTRIN XL) 150 MG 24 hr tablet, bupropion HCl  mg 24 hr tablet, extended release, Disp: , Rfl:     cloNIDine (CATAPRES) 0.1 MG tablet, clonidine HCl 0.1 mg tablet  TAKE 1 TABLET BY MOUTH FOUR TIMES DAILY, Disp: , Rfl:     EPINEPHrine (EPIPEN) 0.3 MG/0.3ML solution auto-injector injection, epinephrine 0.3 mg/0.3 mL injection, auto-injector, Disp: , Rfl:     fluticasone (FLONASE) 50 MCG/ACT nasal spray, 2 sprays into the nostril(s) as directed by provider Daily., Disp: 18.2 mL, Rfl: 0    linaclotide (LINZESS) 290 MCG capsule capsule, Linzess 290 mcg capsule  TAKE ONE CAPSULE BY MOUTH DAILY, Disp: , Rfl:     mirtazapine (REMERON) 15 MG tablet, , Disp: , Rfl:     predniSONE (DELTASONE) 10 MG (21) dose pack, Take  by mouth Daily for 6 days., Disp: 21 each, Rfl: 0    promethazine-dextromethorphan (PROMETHAZINE-DM) 6.25-15 MG/5ML syrup, Take 5 mL by mouth 4 (Four) Times a Day As Needed for Cough., Disp: 118 mL, Rfl: 0    zolpidem CR (AMBIEN CR) 12.5 MG CR tablet, Take 1 tablet by mouth every night at bedtime., Disp: , Rfl:       Review of Systems   Constitutional:  Positive for fatigue. Negative for chills, diaphoresis and fever.   HENT:  Positive for congestion, postnasal drip, rhinorrhea, sinus pressure, sore throat and voice change. Negative for ear pain.    Respiratory:  Positive for cough, shortness of breath and wheezing. Negative for chest tightness.    Cardiovascular:  Negative for chest pain.   Gastrointestinal:  Negative for abdominal pain, diarrhea, nausea and vomiting.   Genitourinary:  Negative for dysuria.   Musculoskeletal:  Positive for myalgias.   Neurological:  Positive for headache.        There were no vitals filed for this visit.    Objective   Physical Exam  Constitutional:       General: She is not in acute distress.     Appearance: Normal appearance.  She is not ill-appearing, toxic-appearing or diaphoretic.   HENT:      Head: Normocephalic.      Nose: Congestion and rhinorrhea present.      Mouth/Throat:      Lips: Pink.      Mouth: Mucous membranes are moist.   Pulmonary:      Effort: Pulmonary effort is normal.   Neurological:      Mental Status: She is alert and oriented to person, place, and time.          Procedures     Assessment & Plan   Diagnoses and all orders for this visit:    1. Lower respiratory infection (e.g., bronchitis, pneumonia, pneumonitis, pulmonitis) (Primary)  -     predniSONE (DELTASONE) 10 MG (21) dose pack; Take  by mouth Daily for 6 days.  Dispense: 21 each; Refill: 0  -     azithromycin (Zithromax Z-Cem) 250 MG tablet; Take 2 tablets by mouth on day 1, then 1 tablet daily on days 2-5  Dispense: 6 tablet; Refill: 0  -     promethazine-dextromethorphan (PROMETHAZINE-DM) 6.25-15 MG/5ML syrup; Take 5 mL by mouth 4 (Four) Times a Day As Needed for Cough.  Dispense: 118 mL; Refill: 0            PLAN: Discussed dosing, side effects, recommended other symptomatic care.  Patient should follow up with primary care provider, Urgent Care or ER if symptoms worsen, fail to resolve or other symptoms need attention. Patient/family agree to the above.         KERRY Jacobo     The use of a video visit has been reviewed with the patient and verbal informed consent has been obtained. Myself and Andra Crandall participated in this visit. The patient is located at [address] [place] [address]. I am located in [place], [place]. Mychart and Zoom were utilized.        This visit was performed via Telehealth.  This patient has been instructed to follow-up with their primary care provider if their symptoms worsen or the treatment provided does not resolve their illness.

## 2024-06-14 ENCOUNTER — TELEMEDICINE (OUTPATIENT)
Dept: FAMILY MEDICINE CLINIC | Facility: TELEHEALTH | Age: 44
End: 2024-06-14
Payer: MEDICAID

## 2024-06-14 DIAGNOSIS — R39.89 SUSPECTED UTI: Primary | ICD-10-CM

## 2024-06-14 RX ORDER — NITROFURANTOIN 25; 75 MG/1; MG/1
100 CAPSULE ORAL 2 TIMES DAILY
Qty: 10 CAPSULE | Refills: 0 | Status: SHIPPED | OUTPATIENT
Start: 2024-06-14 | End: 2024-06-19

## 2024-06-14 RX ORDER — NYSTATIN 100000 U/G
CREAM TOPICAL
COMMUNITY
Start: 2024-05-18

## 2024-06-14 RX ORDER — PHENAZOPYRIDINE HYDROCHLORIDE 200 MG/1
200 TABLET, FILM COATED ORAL 3 TIMES DAILY PRN
Qty: 6 TABLET | Refills: 0 | Status: SHIPPED | OUTPATIENT
Start: 2024-06-14 | End: 2024-06-16

## 2024-06-14 RX ORDER — NALOXONE HYDROCHLORIDE 4 MG/.1ML
SPRAY NASAL
COMMUNITY
Start: 2024-05-15

## 2024-06-14 NOTE — PROGRESS NOTES
Subjective   Chief Complaint   Patient presents with    Urinary Tract Infection       Andra Crandall is a 44 y.o. female.     History of Present Illness  Patient reports burning with urination, dark pink-tinged urine and low back pain.  Symptoms started about 2 weeks ago.  Over the counter medicines have provided temporary relief but symptoms are gradually worsening again.  She has a history of frequent UTIs with exact symptoms.   Urinary Tract Infection   This is a new problem. Episode onset: 2 weeks. The problem occurs intermittently. The quality of the pain is described as burning. There has been no fever. Associated symptoms include frequency, hematuria (dark/pink tinged urine) and urgency. Pertinent negatives include no chills, discharge, flank pain, hesitancy, nausea, possible pregnancy, sweats or vomiting. She has tried increased fluids and home medications for the symptoms. The treatment provided mild relief. Her past medical history is significant for recurrent UTIs. There is no history of kidney stones.        No Known Allergies    Past Medical History:   Diagnosis Date    Depression     Hypertension     Substance abuse     7 yrs ago       History reviewed. No pertinent surgical history.    Social History     Socioeconomic History    Marital status:    Tobacco Use    Smoking status: Some Days     Types: Cigarettes    Smokeless tobacco: Never   Vaping Use    Vaping status: Never Used   Substance and Sexual Activity    Alcohol use: No    Drug use: No    Sexual activity: Defer       History reviewed. No pertinent family history.      Current Outpatient Medications:     naloxone (NARCAN) 4 MG/0.1ML nasal spray, , Disp: , Rfl:     nystatin (MYCOSTATIN) 996949 UNIT/GM cream, , Disp: , Rfl:     Adderall XR 30 MG 24 hr capsule, Take 1 capsule by mouth Every Morning, Disp: , Rfl:     albuterol sulfate  (90 Base) MCG/ACT inhaler, Inhale 2 puffs Every 4 (Four) Hours As Needed for Wheezing., Disp:  18 g, Rfl: 0    amphetamine-dextroamphetamine (ADDERALL) 20 MG tablet, Take 1 tablet by mouth Daily., Disp: , Rfl:     azithromycin (Zithromax Z-Cem) 250 MG tablet, Take 2 tablets by mouth on day 1, then 1 tablet daily on days 2-5, Disp: 6 tablet, Rfl: 0    budesonide (Pulmicort) 0.25 MG/2ML nebulizer solution, Take 2 mL by nebulization Daily for 7 days., Disp: 14 mL, Rfl: 0    buprenorphine-naloxone (SUBOXONE) 8-2 MG film film, , Disp: , Rfl:     buPROPion XL (WELLBUTRIN XL) 150 MG 24 hr tablet, bupropion HCl  mg 24 hr tablet, extended release, Disp: , Rfl:     cloNIDine (CATAPRES) 0.1 MG tablet, clonidine HCl 0.1 mg tablet  TAKE 1 TABLET BY MOUTH FOUR TIMES DAILY, Disp: , Rfl:     EPINEPHrine (EPIPEN) 0.3 MG/0.3ML solution auto-injector injection, epinephrine 0.3 mg/0.3 mL injection, auto-injector, Disp: , Rfl:     fluticasone (FLONASE) 50 MCG/ACT nasal spray, 2 sprays into the nostril(s) as directed by provider Daily., Disp: 18.2 mL, Rfl: 0    linaclotide (LINZESS) 290 MCG capsule capsule, Linzess 290 mcg capsule  TAKE ONE CAPSULE BY MOUTH DAILY, Disp: , Rfl:     mirtazapine (REMERON) 15 MG tablet, , Disp: , Rfl:     nitrofurantoin, macrocrystal-monohydrate, (Macrobid) 100 MG capsule, Take 1 capsule by mouth 2 (Two) Times a Day for 5 days., Disp: 10 capsule, Rfl: 0    phenazopyridine (Pyridium) 200 MG tablet, Take 1 tablet by mouth 3 (Three) Times a Day As Needed for Bladder Spasms or Dysuria for up to 2 days., Disp: 6 tablet, Rfl: 0    promethazine-dextromethorphan (PROMETHAZINE-DM) 6.25-15 MG/5ML syrup, Take 5 mL by mouth 4 (Four) Times a Day As Needed for Cough., Disp: 118 mL, Rfl: 0    sertraline (ZOLOFT) 50 MG tablet, , Disp: , Rfl:     zolpidem CR (AMBIEN CR) 12.5 MG CR tablet, Take 1 tablet by mouth every night at bedtime., Disp: , Rfl:       Review of Systems   Constitutional:  Negative for chills, diaphoresis, fatigue and fever.   Gastrointestinal:  Negative for abdominal distention, abdominal  pain, nausea and vomiting.   Genitourinary:  Positive for dysuria, frequency, hematuria (dark/pink tinged urine) and urgency. Negative for flank pain, hesitancy, urinary incontinence, vaginal bleeding, vaginal discharge and vaginal pain.   Musculoskeletal:  Positive for back pain (low back).        There were no vitals filed for this visit.    Objective   Physical Exam  Constitutional:       General: She is not in acute distress.     Appearance: Normal appearance. She is not ill-appearing, toxic-appearing or diaphoretic.   HENT:      Head: Normocephalic.      Mouth/Throat:      Lips: Pink.      Mouth: Mucous membranes are moist.   Pulmonary:      Effort: Pulmonary effort is normal.   Abdominal:      Tenderness: There is no abdominal tenderness (per pt).   Neurological:      Mental Status: She is alert and oriented to person, place, and time.          Procedures     Assessment & Plan   Diagnoses and all orders for this visit:    1. Suspected UTI (Primary)  -     nitrofurantoin, macrocrystal-monohydrate, (Macrobid) 100 MG capsule; Take 1 capsule by mouth 2 (Two) Times a Day for 5 days.  Dispense: 10 capsule; Refill: 0  -     phenazopyridine (Pyridium) 200 MG tablet; Take 1 tablet by mouth 3 (Three) Times a Day As Needed for Bladder Spasms or Dysuria for up to 2 days.  Dispense: 6 tablet; Refill: 0      Wipe front to back, increase water to flush the kidneys and avoid bladder irritants such as caffeine and alcohol.    -Warning signs: severe abdominal/pelvic/back pain, fever >101, blood in urine - seek medical attention as soon as possible for a hands on/objective exam and possible labs.     If symptoms worsen or do not improve follow up with your PCP or visit your nearest Urgent Care Center or ER.      PLAN: Discussed dosing, side effects, recommended other symptomatic care.  Patient should follow up with primary care provider, Urgent Care or ER if symptoms worsen, fail to resolve or other symptoms need attention.  Patient/family agree to the above.         KERRY Jacobo     The use of a video visit has been reviewed with the patient and verbal informed consent has been obtained. Myself and Andra Crandall participated in this visit. The patient is located at [address] [place] [address]. I am located in [place], [place]. Mychart and Zoom were utilized.        This visit was performed via Telehealth.  This patient has been instructed to follow-up with their primary care provider if their symptoms worsen or the treatment provided does not resolve their illness.

## 2024-06-17 ENCOUNTER — TELEMEDICINE (OUTPATIENT)
Dept: FAMILY MEDICINE CLINIC | Facility: TELEHEALTH | Age: 44
End: 2024-06-17
Payer: MEDICAID

## 2024-06-17 DIAGNOSIS — L23.7 POISON IVY: Primary | ICD-10-CM

## 2024-06-17 PROCEDURE — 1160F RVW MEDS BY RX/DR IN RCRD: CPT | Performed by: NURSE PRACTITIONER

## 2024-06-17 PROCEDURE — 99213 OFFICE O/P EST LOW 20 MIN: CPT | Performed by: NURSE PRACTITIONER

## 2024-06-17 PROCEDURE — 1159F MED LIST DOCD IN RCRD: CPT | Performed by: NURSE PRACTITIONER

## 2024-06-17 RX ORDER — PREDNISONE 20 MG/1
TABLET ORAL
Qty: 20 TABLET | Refills: 0 | Status: SHIPPED | OUTPATIENT
Start: 2024-06-17

## 2024-06-17 RX ORDER — TRIAMCINOLONE ACETONIDE 1 MG/G
1 OINTMENT TOPICAL 2 TIMES DAILY
Qty: 30 G | Refills: 0 | Status: SHIPPED | OUTPATIENT
Start: 2024-06-17

## 2024-06-17 NOTE — PROGRESS NOTES
You have chosen to receive care through a telehealth visit.  Do you consent to use a video/audio connection for your medical care today? Yes     HPI  Andra Crandall is a 44 y.o. female  presents with complaint of poison ivy on her left hip that she reports is spreading. She is requesting prednisone.     Review of Systems   Constitutional: Negative.    Skin:  Positive for rash.        Several areas of itchy red rash on left hip   Psychiatric/Behavioral: Negative.         Past Medical History:   Diagnosis Date    Depression     Hypertension     Substance abuse     7 yrs ago       No family history on file.    Social History     Socioeconomic History    Marital status:    Tobacco Use    Smoking status: Some Days     Types: Cigarettes    Smokeless tobacco: Never   Vaping Use    Vaping status: Never Used   Substance and Sexual Activity    Alcohol use: No    Drug use: No    Sexual activity: Defer         There were no vitals taken for this visit.    PHYSICAL EXAM  Physical Exam   Constitutional: She is oriented to person, place, and time. She appears well-developed and well-nourished. She does not have a sickly appearance. She does not appear ill. No distress.   HENT:   Head: Normocephalic and atraumatic.   Mouth/Throat: Mouth/Lips are normal.  Pulmonary/Chest: Effort normal.   Neurological: She is alert and oriented to person, place, and time.   Psychiatric: She has a normal mood and affect.   Vitals reviewed.      Diagnoses and all orders for this visit:    1. Poison ivy (Primary)  -     predniSONE (DELTASONE) 20 MG tablet; Take 3 tabs for 3 days, 2 tabs for 3 days 1 tab for 3 days then 1/2 tab for 3 days then d/c.  Dispense: 20 tablet; Refill: 0  -     triamcinolone (KENALOG) 0.1 % ointment; Apply 1 Application topically to the appropriate area as directed 2 (Two) Times a Day.  Dispense: 30 g; Refill: 0    Cool showers   Avoid overheating.   Avoid sun exposure while taking steroids.   Take steroids with  food.       FOLLOW-UP  As discussed during visit with [facility], if symptoms worsen or fail to improve, follow-up with PCP/Urgent Care/Emergency Department.    Patient verbalizes understanding of medications, instructions for treatment and follow-up.    Uyen Azevedo, KERRY  06/17/2024  11:32 EDT    The use of a video visit has been reviewed with the patient and verbal informed consent has been obtained. Myself and Andra Crandall participated in this visit. The patient is located in [facility], and I am located in [place], [place]. [employer]  were utilized.

## 2024-07-19 ENCOUNTER — TELEMEDICINE (OUTPATIENT)
Dept: FAMILY MEDICINE CLINIC | Facility: TELEHEALTH | Age: 44
End: 2024-07-19
Payer: MEDICAID

## 2024-07-19 DIAGNOSIS — Z76.0 ENCOUNTER FOR MEDICATION REFILL: Primary | ICD-10-CM

## 2024-07-19 RX ORDER — CLONIDINE HYDROCHLORIDE 0.1 MG/1
0.1 TABLET ORAL 4 TIMES DAILY
Qty: 56 TABLET | Refills: 0 | Status: SHIPPED | OUTPATIENT
Start: 2024-07-19 | End: 2024-08-02

## 2024-07-19 NOTE — PROGRESS NOTES
You have chosen to receive care through a telehealth visit.  Do you consent to use a video/audio connection for your medical care today? Yes     CHIEF COMPLAINT  Chief Complaint   Patient presents with    Med Refill         HPI  Andra Crandall is a 44 y.o. female  presents with complaint of needing a refill on her Clonidine.  She states that she just moved and doesn't have a primary doctor yet    Review of Systems   Constitutional:         Med refill   All other systems reviewed and are negative.      Past Medical History:   Diagnosis Date    Depression     Hypertension     Substance abuse     7 yrs ago       History reviewed. No pertinent family history.    Social History     Socioeconomic History    Marital status:    Tobacco Use    Smoking status: Some Days     Types: Cigarettes    Smokeless tobacco: Never   Vaping Use    Vaping status: Never Used   Substance and Sexual Activity    Alcohol use: No    Drug use: No    Sexual activity: Defer       Andra Carndall  reports that she has been smoking cigarettes. She has never used smokeless tobacco. I have educated her on the risk of diseases from using tobacco products such as cancer, COPD, and heart disease.     I advised her to quit and she is not willing to quit.    I spent  1  minutes counseling the patient.              There were no vitals taken for this visit.    PHYSICAL EXAM  Physical Exam   Constitutional: She appears well-developed and well-nourished.   HENT:   Head: Normocephalic.   Eyes: Pupils are equal, round, and reactive to light.   Pulmonary/Chest: Effort normal.   Musculoskeletal: Normal range of motion.   Neurological: She is alert.   Psychiatric: She has a normal mood and affect.       Results for orders placed or performed during the hospital encounter of 05/15/22   COVID-19 PCR, [facility], NP SWAB IN [facility] V[facility]AL TRANSPORT MEDIA/ORAL SWISH 24-30 HR TAT - Saliva, Oral Cavity    Specimen: Oral Cavity; Saliva   Result Value Ref Range     SARS-CoV-2 RAFA Not Detected Not Detected       Diagnoses and all orders for this visit:    1. Encounter for medication refill (Primary)  -     cloNIDine (Catapres) 0.1 MG tablet; Take 1 tablet by mouth 4 (Four) Times a Day for 14 days.  Dispense: 56 tablet; Refill: 0          FOLLOW-UP  As discussed during visit with PCP/[facility] if no improvement or Urgent Care/Emergency Department if worsening of symptoms    Patient verbalizes understanding of medication dosage, comfort measures, instructions for treatment and follow-up.    KERRY Floyd  07/19/2024  19:10 EDT    The use of a video visit has been reviewed with the patient and verbal informed consent has been obtained. Myself and Andra Crandall participated in this visit. The patient is located in [address] [place] [address].    I am located in . [employer] and [employer] were utilized. I spent 10 minutes in the patient's chart for this visit.      Note Disclaimer: At [facility], we believe that sharing information builds trust and better   relationships. You are receiving this note because you recently visited [facility]. It is possible you   will see health information before a provider has talked with you about it. This kind of information can   be easy to misunderstand. To help you fully understand what it means for your health, we urge you to   discuss this note with your provider.

## 2024-08-11 ENCOUNTER — TELEMEDICINE (OUTPATIENT)
Dept: FAMILY MEDICINE CLINIC | Facility: TELEHEALTH | Age: 44
End: 2024-08-11
Payer: MEDICAID

## 2024-08-11 DIAGNOSIS — L23.7 POISON IVY DERMATITIS: Primary | ICD-10-CM

## 2024-08-11 PROBLEM — I10 HYPERTENSIVE DISORDER: Status: RESOLVED | Noted: 2017-08-17 | Resolved: 2024-08-11

## 2024-08-11 PROBLEM — R74.8 ELEVATED LIVER ENZYMES: Status: RESOLVED | Noted: 2020-05-08 | Resolved: 2024-08-11

## 2024-08-11 RX ORDER — METHYLPREDNISOLONE 4 MG/1
TABLET ORAL
Qty: 1 EACH | Refills: 0 | Status: SHIPPED | OUTPATIENT
Start: 2024-08-11

## 2024-08-11 RX ORDER — TRIAMCINOLONE ACETONIDE 5 MG/G
CREAM TOPICAL
COMMUNITY
Start: 2024-06-25

## 2024-08-11 RX ORDER — DEXTROAMPHETAMINE SACCHARATE, AMPHETAMINE ASPARTATE MONOHYDRATE, DEXTROAMPHETAMINE SULFATE AND AMPHETAMINE SULFATE 7.5; 7.5; 7.5; 7.5 MG/1; MG/1; MG/1; MG/1
CAPSULE, EXTENDED RELEASE ORAL EVERY 24 HOURS
COMMUNITY
Start: 2024-07-30

## 2024-08-11 RX ORDER — DEXTROAMPHETAMINE SACCHARATE, AMPHETAMINE ASPARTATE, DEXTROAMPHETAMINE SULFATE AND AMPHETAMINE SULFATE 5; 5; 5; 5 MG/1; MG/1; MG/1; MG/1
TABLET ORAL EVERY 12 HOURS SCHEDULED
COMMUNITY
Start: 2024-07-30

## 2024-08-11 NOTE — PATIENT INSTRUCTIONS
Contact Dermatitis  Dermatitis is when your skin becomes red, sore, and swollen.   Contact dermatitis happens when your body reacts to something that touches the skin. There are 2 types:  Irritant contact dermatitis. This is when something bothers your skin, like soap.  Allergic contact dermatitis. This is when your skin touches something you are allergic to, like poison ivy.  What are the causes?  Irritant contact dermatitis may be caused by:  Makeup.  Soaps.  Detergents.  Bleaches.  Acids.  Metals, like nickel.  Allergic contact dermatitis may be caused by:  Plants.  Chemicals.  Jewelry.  Latex.  Medicines.  Preservatives. These are things added to products to help them last longer. There may be some in your clothes.  What increases the risk?  Having a job where you have to be near things that bother your skin.  Having asthma or eczema.  What are the signs or symptoms?    Dry or flaky skin.  Redness.  Cracks.  Itching.  Moderate symptoms of this condition include:  Pain or a burning feeling.  Blisters.  Blood or clear fluid coming from cracks in your skin.  Swelling. This may be on your eyelids, mouth, or genitals.  How is this treated?  Your doctor will find out what is making your skin react. Then, you can protect your skin. You may need to use:  Steroid creams, ointments, or medicines.  Antibiotics or other ointments, if you have a skin infection.  Lotion or medicines to help with itching.  A bandage.  Follow these instructions at home:  Skin care  Put moisturizer on your skin when it needs it.  Put cool, wet cloths on your skin (cool compresses).  Put a baking soda paste on your skin. Stir water into baking soda until it looks like a paste.  Do not scratch your skin.  Try not to have things rub up against your skin. Avoid tight clothing.  Avoid using soaps, perfumes, and dyes.  Check your skin every day for signs of infection. Check for:  More redness, swelling, or pain.  More fluid or blood.  Warmth.  Pus or  a bad smell.  Medicines  Take or apply over-the-counter and prescription medicines only as told by your doctor.  If you were prescribed antibiotics, take or apply them as told by your doctor. Do not stop using them even if you start to feel better.  Bathing  Take a bath with:  Epsom salts.  Baking soda.  Colloidal oatmeal.  Bathe less often.  Bathe in warm water. Try not to use hot water.  Bandage care  If you were given a bandage, change it as told by your doctor.  Wash your hands with soap and water for at least 20 seconds before and after you change your bandage. If you cannot use soap and water, use hand .  General instructions  Avoid the things that caused your reaction. If you don't know what caused it, keep a journal. Write down:  What you eat.  What skin products you use.  What you drink.  What you wear.  Contact a doctor if:  You do not get better with treatment.  You get worse.  You have signs of infection.  You have a fever.  You have new symptoms.  Your bone or joint near the area hurts after the skin has healed.  Get help right away if:  You see red streaks coming from the area.  The area turns darker.  You have trouble breathing.  This information is not intended to replace advice given to you by your health care provider. Make sure you discuss any questions you have with your health care provider.  Document Revised: 06/23/2023 Document Reviewed: 06/23/2023  [employer] Patient Education © 2024 [employer]

## 2024-08-11 NOTE — PROGRESS NOTES
Chief Complaint   Patient presents with    Rash       Video Visit Reason:   Free Text Description: Rash spreading quickly  Subjective   Andra Crandall is a 44 y.o. female.     History of Present Illness  She has a rash on her arms, legs, chest, and neck that is spreading all over. She says that she works outside a lot and she has poison ivy.  Rash  This is a new problem. The current episode started in the past 7 days. The problem has been worse since onset. The affected locations include the neck, chest, left hand, left wrist, right hand, right wrist, right lower leg and left lower leg. The rash is characterized by redness and itchiness. She was exposed to plant contact. Pertinent negatives include no fatigue, fever or shortness of breath.       The following portions of the patient's history were reviewed and updated as appropriate: allergies, current medications, past medical history, and problem list.      Past Medical History:   Diagnosis Date    Depression     Hypertension     Substance abuse     7 yrs ago     Social History     Socioeconomic History    Marital status:    Tobacco Use    Smoking status: Some Days     Types: Cigarettes    Smokeless tobacco: Never   Vaping Use    Vaping status: Never Used   Substance and Sexual Activity    Alcohol use: No    Drug use: No    Sexual activity: Defer     medication documentation: reviewed and updated with patient and   Current Outpatient Medications:     amphetamine-dextroamphetamine (Adderall) 20 MG tablet, Every 12 (Twelve) Hours., Disp: , Rfl:     amphetamine-dextroamphetamine XR (Adderall XR) 30 MG 24 hr capsule, Daily, Disp: , Rfl:     triamcinolone (KENALOG) 0.5 % cream, , Disp: , Rfl:     Adderall XR 30 MG 24 hr capsule, Take 1 capsule by mouth Every Morning, Disp: , Rfl:     albuterol sulfate  (90 Base) MCG/ACT inhaler, Inhale 2 puffs Every 4 (Four) Hours As Needed for Wheezing., Disp: 18 g, Rfl: 0    amphetamine-dextroamphetamine (ADDERALL)  20 MG tablet, Take 1 tablet by mouth Daily., Disp: , Rfl:     budesonide (Pulmicort) 0.25 MG/2ML nebulizer solution, Take 2 mL by nebulization Daily for 7 days., Disp: 14 mL, Rfl: 0    buprenorphine-naloxone (SUBOXONE) 8-2 MG film film, , Disp: , Rfl:     buPROPion XL (WELLBUTRIN XL) 150 MG 24 hr tablet, bupropion HCl  mg 24 hr tablet, extended release, Disp: , Rfl:     cloNIDine (CATAPRES) 0.1 MG tablet, clonidine HCl 0.1 mg tablet  TAKE 1 TABLET BY MOUTH FOUR TIMES DAILY, Disp: , Rfl:     cloNIDine (Catapres) 0.1 MG tablet, Take 1 tablet by mouth 4 (Four) Times a Day for 14 days., Disp: 56 tablet, Rfl: 0    EPINEPHrine (EPIPEN) 0.3 MG/0.3ML solution auto-injector injection, epinephrine 0.3 mg/0.3 mL injection, auto-injector, Disp: , Rfl:     fluticasone (FLONASE) 50 MCG/ACT nasal spray, 2 sprays into the nostril(s) as directed by provider Daily., Disp: 18.2 mL, Rfl: 0    linaclotide (LINZESS) 290 MCG capsule capsule, Linzess 290 mcg capsule  TAKE ONE CAPSULE BY MOUTH DAILY, Disp: , Rfl:     methylPREDNISolone (MEDROL) 4 MG dose pack, Take as directed on package instructions., Disp: 1 each, Rfl: 0    mirtazapine (REMERON) 15 MG tablet, , Disp: , Rfl:     naloxone (NARCAN) 4 MG/0.1ML nasal spray, , Disp: , Rfl:     sertraline (ZOLOFT) 50 MG tablet, , Disp: , Rfl:     triamcinolone (KENALOG) 0.1 % ointment, Apply 1 Application topically to the appropriate area as directed 2 (Two) Times a Day., Disp: 30 g, Rfl: 0    zolpidem CR (AMBIEN CR) 12.5 MG CR tablet, Take 1 tablet by mouth every night at bedtime., Disp: , Rfl:   Review of Systems   Constitutional:  Negative for fatigue and fever.   Respiratory:  Negative for shortness of breath.    Skin:  Positive for rash.       Objective   Physical Exam  Constitutional:       General: She is not in acute distress.  Pulmonary:      Effort: Pulmonary effort is normal.   Skin:     Comments: Maculopapular rash in a raised linear pattern on lower legs and arms, c/w contact  dermatitis   Neurological:      Mental Status: She is alert.         Assessment & Plan   Diagnoses and all orders for this visit:    1. Poison ivy dermatitis (Primary)  -     methylPREDNISolone (MEDROL) 4 MG dose pack; Take as directed on package instructions.  Dispense: 1 each; Refill: 0                    Follow Up:  If your symptoms are not resolving by the completion of your treatment or are worsening, see your primary care provider for follow up. If you don't have a primary care provider, you may go to any Urgent Care for re-evaluation. If you develop any life threatening symptoms, go to the nearest Emergency Department immediately or call EMS.               The use of  Video Visit was utilized during this visit, using both [employer] and [employer]. The use of a video visit has been reviewed with the patient and verbal informed consent has been obtained. No technical difficulties occurred during the visit.    is located at [address] [place] [address]  Provider is located at [place], [place]

## 2024-08-15 ENCOUNTER — TELEMEDICINE (OUTPATIENT)
Dept: FAMILY MEDICINE CLINIC | Facility: TELEHEALTH | Age: 44
End: 2024-08-15
Payer: MEDICAID

## 2024-08-15 DIAGNOSIS — K59.00 CONSTIPATION, UNSPECIFIED CONSTIPATION TYPE: Primary | ICD-10-CM

## 2024-08-15 NOTE — PROGRESS NOTES
No chief complaint on file.      Video Visit Reason:   Free Text Description: I cannot find my linzess and my stomach is killing me  Subjective   Andra Crandall is a 44 y.o. female.     History of Present Illness  Constipation with prescription for linzess that has . She states that she has moved from [place] and has not found a new PCP. She is having stomach pain due to constipation and requests refill. She says nothing else works.      The following portions of the patient's history were reviewed and updated as appropriate: allergies, current medications, past medical history, and problem list.      Past Medical History:   Diagnosis Date    Depression     Hypertension     Substance abuse     7 yrs ago     Social History     Socioeconomic History    Marital status:    Tobacco Use    Smoking status: Some Days     Types: Cigarettes    Smokeless tobacco: Never   Vaping Use    Vaping status: Never Used   Substance and Sexual Activity    Alcohol use: No    Drug use: No    Sexual activity: Defer     medication documentation: reviewed and updated with patient and   Current Outpatient Medications:     linaclotide (LINZESS) 290 MCG capsule capsule, Take 1 capsule by mouth Every Morning Before Breakfast for 30 days., Disp: 30 capsule, Rfl: 0    Adderall XR 30 MG 24 hr capsule, Take 1 capsule by mouth Every Morning, Disp: , Rfl:     albuterol sulfate  (90 Base) MCG/ACT inhaler, Inhale 2 puffs Every 4 (Four) Hours As Needed for Wheezing., Disp: 18 g, Rfl: 0    amphetamine-dextroamphetamine (ADDERALL) 20 MG tablet, Take 1 tablet by mouth Daily., Disp: , Rfl:     amphetamine-dextroamphetamine (Adderall) 20 MG tablet, Every 12 (Twelve) Hours., Disp: , Rfl:     amphetamine-dextroamphetamine XR (Adderall XR) 30 MG 24 hr capsule, Daily, Disp: , Rfl:     budesonide (Pulmicort) 0.25 MG/2ML nebulizer solution, Take 2 mL by nebulization Daily for 7 days., Disp: 14 mL, Rfl: 0    buprenorphine-naloxone  (SUBOXONE) 8-2 MG film film, , Disp: , Rfl:     buPROPion XL (WELLBUTRIN XL) 150 MG 24 hr tablet, bupropion HCl  mg 24 hr tablet, extended release, Disp: , Rfl:     cloNIDine (CATAPRES) 0.1 MG tablet, clonidine HCl 0.1 mg tablet  TAKE 1 TABLET BY MOUTH FOUR TIMES DAILY, Disp: , Rfl:     cloNIDine (Catapres) 0.1 MG tablet, Take 1 tablet by mouth 4 (Four) Times a Day for 14 days., Disp: 56 tablet, Rfl: 0    EPINEPHrine (EPIPEN) 0.3 MG/0.3ML solution auto-injector injection, epinephrine 0.3 mg/0.3 mL injection, auto-injector, Disp: , Rfl:     fluticasone (FLONASE) 50 MCG/ACT nasal spray, 2 sprays into the nostril(s) as directed by provider Daily., Disp: 18.2 mL, Rfl: 0    methylPREDNISolone (MEDROL) 4 MG dose pack, Take as directed on package instructions., Disp: 1 each, Rfl: 0    mirtazapine (REMERON) 15 MG tablet, , Disp: , Rfl:     naloxone (NARCAN) 4 MG/0.1ML nasal spray, , Disp: , Rfl:     sertraline (ZOLOFT) 50 MG tablet, , Disp: , Rfl:     triamcinolone (KENALOG) 0.1 % ointment, Apply 1 Application topically to the appropriate area as directed 2 (Two) Times a Day., Disp: 30 g, Rfl: 0    triamcinolone (KENALOG) 0.5 % cream, , Disp: , Rfl:     zolpidem CR (AMBIEN CR) 12.5 MG CR tablet, Take 1 tablet by mouth every night at bedtime., Disp: , Rfl:   Review of Systems   Gastrointestinal:  Positive for abdominal pain and constipation. Negative for anal bleeding, blood in stool, diarrhea, nausea, rectal pain and vomiting.       Objective   Physical Exam  Constitutional:       General: She is not in acute distress.  Pulmonary:      Effort: Pulmonary effort is normal.   Abdominal:      General: There is no distension.   Neurological:      Mental Status: She is alert.   Psychiatric:         Mood and Affect: Mood normal.         Assessment & Plan   Diagnoses and all orders for this visit:    1. Constipation, unspecified constipation type (Primary)  -     linaclotide (LINZESS) 290 MCG capsule capsule; Take 1 capsule  by mouth Every Morning Before Breakfast for 30 days.  Dispense: 30 capsule; Refill: 0                    Follow Up:  If your symptoms are not resolving by the completion of your treatment or are worsening, see your primary care provider for follow up. If you don't have a primary care provider, you may go to any Urgent Care for re-evaluation. If you develop any life threatening symptoms, go to the nearest Emergency Department immediately or call EMS.               The use of  Video Visit was utilized during this visit, using both [employer] and [employer]. The use of a video visit has been reviewed with the patient and verbal informed consent has been obtained. No technical difficulties occurred during the visit.    is located at [address] [place] [id number]  Provider is located at [place], [place]

## 2024-08-15 NOTE — PATIENT INSTRUCTIONS
Chronic Constipation  Chronic constipation is when a person poops 3 times a week or less for 3 months or longer. It is most common in older adults.  What are the causes?    This condition may be caused by:  Not drinking enough fluid, eating enough fiber, or getting enough exercise.  Pregnancy.  An anal fissure. This is a tear in the opening of the butt (anus).  Bowel obstruction. This is when there is a blockage in your intestine (bowel).  Bowel stricture. This is when your bowel narrows.  A long-term (chronic) condition, such as:  Diabetes or hypothyroidism.  A stroke or injury to the spinal cord.  Multiple sclerosis or Parkinson's disease.  Colon cancer.  Dementia.  Inflammatory bowel disease (IBD), rectal prolapse, or hemorrhoids.  Taking certain medicines. These include:  Some pain medicines.  Antacids or iron supplements.  Water pills (diuretics).  Some blood pressure medicines.  Medicines to keep you from having seizures (anti-seizure medicines).  Antidepressants.  Medicines for Parkinson's disease.  Other causes may include:  Stress.  Problems with the nerves and muscles that help you poop.  Weak muscles in the area between your hip bones (pelvis).  What increases the risk?  You may be more at risk for this condition if:  You are older than 70 years of age.  You are female.  You live in a long-term care facility.  You have a chronic condition.  You have a mental health disorder or eating disorder.  What are the signs or symptoms?  The main sign of this condition is pooping 3 times a week or less. Other signs and symptoms may include:  Pushing hard (straining) to poop.  Hard or lumpy poop (stool).  Pain when you poop.  Discomfort in your lower abdomen. This may include cramps and bloating.  Not being able to poop when you need to.  Feeling like you still need to poop after you are done.  Feeling like there is something in your rectum that is blocking or stopping you from pooping.  Blood in your poop or seeing  blood on the toilet paper after you poop.  Confusion. This is most common in older adults.  How is this diagnosed?  This condition may be diagnosed based on your symptoms and medical history. You may also need:  An exam of your abdomen.  A digital rectal exam. For this exam, your health care provider will put a gloved finger into your rectum.  Testing. Tests may include:  Imaging studies, such as an X-ray, ultrasound, or CT scan.  Blood tests.  A colonoscopy. This is a test that looks at your colon.  A test of how well your anal sphincter works. This is a muscle shaped like a ring. It helps your anus open and close.  A test of how well food moves through your colon.  An electromyography. This is a test of the nerves in your pelvis.  How is this treated?    Treatment depends on the cause. You may need to:  Be more active.  Drink more fluids.  Add fiber to your diet. Sources of fiber include fruits, vegetables, and whole grains. You may also need to take a fiber supplement.  Stop or change medicines that can cause constipation.  Take medicines, such as:  Pro-motility agents. These help your muscles tighten (contract). This can help you push out poop.  Stool softeners. These soften your poop.  An osmotic laxative. A laxative is a medicine that helps you poop. This type works by absorbing water into your colon.  Train the muscles in your pelvis with biofeedback.  Have surgery. This may be done if something is blocking your bowels.  You may also need to see an expert in problems with the digestive system (gastroenterologist).  Follow these instructions at home:  Medicines  Take over-the-counter and prescription medicines only as told by your provider.  If you are taking a laxative, take it only as told by your provider.  Eating and drinking    Drink enough fluid to keep your pee (urine) pale yellow.  Eat foods that are high in fiber, such as beans, whole grains, and fresh fruits and vegetables.  Limit foods that are high  in fat and processed sugars, such as fried or sweet foods.  Stay away from alcohol, caffeine, and soda. These can make constipation worse.  General instructions  Get physical activity every day. Ask your provider what activities are safe for you.  Get colon cancer screenings as told by your provider.  Contact a health care provider if:  You poop 3 times a week or less.  Your poop is hard or lumpy.  There is blood on the toilet paper or in your poop.  You lose weight for no clear reason.  You have pain in your rectum.  Poop is leaking out of your rectum.  You have nausea or vomiting.  This information is not intended to replace advice given to you by your health care provider. Make sure you discuss any questions you have with your health care provider.  Document Revised: 10/02/2023 Document Reviewed: 10/02/2023  [employer] Patient Education © 2024 [employer]

## 2025-01-18 ENCOUNTER — TELEMEDICINE (OUTPATIENT)
Dept: FAMILY MEDICINE CLINIC | Facility: TELEHEALTH | Age: 45
End: 2025-01-18
Payer: MEDICAID

## 2025-01-18 DIAGNOSIS — J06.9 UPPER RESPIRATORY TRACT INFECTION, UNSPECIFIED TYPE: Primary | ICD-10-CM

## 2025-01-18 DIAGNOSIS — L73.9 FOLLICULITIS: ICD-10-CM

## 2025-01-18 RX ORDER — PREDNISONE 20 MG/1
20 TABLET ORAL 2 TIMES DAILY
Qty: 14 TABLET | Refills: 0 | Status: SHIPPED | OUTPATIENT
Start: 2025-01-18 | End: 2025-01-25

## 2025-01-18 RX ORDER — MUPIROCIN 20 MG/G
1 OINTMENT TOPICAL 3 TIMES DAILY
Qty: 22 G | Refills: 0 | Status: SHIPPED | OUTPATIENT
Start: 2025-01-18

## 2025-01-18 RX ORDER — CEPHALEXIN 750 MG/1
750 CAPSULE ORAL EVERY 12 HOURS
Qty: 14 CAPSULE | Refills: 0 | Status: SHIPPED | OUTPATIENT
Start: 2025-01-18 | End: 2025-01-25

## 2025-01-18 RX ORDER — BROMPHENIRAMINE MALEATE, PSEUDOEPHEDRINE HYDROCHLORIDE, AND DEXTROMETHORPHAN HYDROBROMIDE 2; 30; 10 MG/5ML; MG/5ML; MG/5ML
5 SYRUP ORAL 3 TIMES DAILY PRN
Qty: 118 ML | Refills: 0 | Status: SHIPPED | OUTPATIENT
Start: 2025-01-18 | End: 2025-01-23
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
